# Patient Record
Sex: FEMALE | Race: OTHER
[De-identification: names, ages, dates, MRNs, and addresses within clinical notes are randomized per-mention and may not be internally consistent; named-entity substitution may affect disease eponyms.]

---

## 2021-03-24 PROBLEM — Z00.00 ENCOUNTER FOR PREVENTIVE HEALTH EXAMINATION: Status: ACTIVE | Noted: 2021-03-24

## 2021-04-07 ENCOUNTER — APPOINTMENT (OUTPATIENT)
Dept: SURGERY | Facility: CLINIC | Age: 86
End: 2021-04-07
Payer: MEDICARE

## 2021-04-07 ENCOUNTER — LABORATORY RESULT (OUTPATIENT)
Age: 86
End: 2021-04-07

## 2021-04-07 VITALS
BODY MASS INDEX: 27.88 KG/M2 | TEMPERATURE: 97.3 F | WEIGHT: 142 LBS | HEART RATE: 58 BPM | SYSTOLIC BLOOD PRESSURE: 179 MMHG | DIASTOLIC BLOOD PRESSURE: 67 MMHG | HEIGHT: 60 IN

## 2021-04-07 DIAGNOSIS — Z78.9 OTHER SPECIFIED HEALTH STATUS: ICD-10-CM

## 2021-04-07 DIAGNOSIS — Z87.39 PERSONAL HISTORY OF OTHER DISEASES OF THE MUSCULOSKELETAL SYSTEM AND CONNECTIVE TISSUE: ICD-10-CM

## 2021-04-07 DIAGNOSIS — Z87.19 PERSONAL HISTORY OF OTHER DISEASES OF THE DIGESTIVE SYSTEM: ICD-10-CM

## 2021-04-07 DIAGNOSIS — R73.03 PREDIABETES.: ICD-10-CM

## 2021-04-07 PROCEDURE — 99203 OFFICE O/P NEW LOW 30 MIN: CPT | Mod: 25

## 2021-04-07 PROCEDURE — 11104 PUNCH BX SKIN SINGLE LESION: CPT

## 2021-04-07 NOTE — PROCEDURE
[FreeTextEntry1] : After informed consent was signed by the patient and witnessed by the medical assistant, the skin over the left rectus muscle was prepped with Chlorhexadine and 8cc of 1% lidocaine with epinephrine were used to anesthetize the skin. A 15 blade was then used to make a 1 cm incision. About 0/5-1cm of fatty tissue was removed sharply. Once the specimen was freed, it was sent labeled to pathology in [place]. The wound bed was inspected for hemostasis and controlled with pressure. Once hemostasis was adequate, the wound was closed with 4-0 Vicryl suture. The wound was dressed with steristrips, gauze and tegaderm. The patient tolerated the procedure well.

## 2021-04-07 NOTE — HISTORY OF PRESENT ILLNESS
[de-identified] : 86 yo F referred by Dr. Timmons for abdominal fat pad biopsy. The patient has a history of nephrotic range proteinuria, HTN and CHF. There is some concern that her kidney disease is a result of amyloidosis and she is referred for fat biopsy for pathological diagnosis. The patient is on aspirin 81mg, but no other blood thinners.

## 2021-04-07 NOTE — PHYSICAL EXAM
[Normal Rate and Rhythm] : normal rate and rhythm [Respiratory Effort] : normal respiratory effort [No Rash or Lesion] : No rash or lesion [Alert] : alert [Calm] : calm [de-identified] : NAD, well-appearing [de-identified] : Anicteric [de-identified] : soft, nontender, nondistended

## 2021-04-07 NOTE — CONSULT LETTER
[Dear  ___] : Dear  [unfilled], [( Thank you for referring [unfilled] for consultation for _____ )] : Thank you for referring [unfilled] for consultation for [unfilled] [Please see my note below.] : Please see my note below. [Consult Closing:] : Thank you very much for allowing me to participate in the care of this patient.  If you have any questions, please do not hesitate to contact me. [Sincerely,] : Sincerely, [FreeTextEntry3] : Daniela Merchant MD [DrLuis A  ___] : Dr. CLINE

## 2021-04-07 NOTE — HISTORY OF PRESENT ILLNESS
[de-identified] : 86 yo F referred by Dr. Timmons for abdominal fat pad biopsy. The patient has a history of nephrotic range proteinuria, HTN and CHF. There is some concern that her kidney disease is a result of amyloidosis and she is referred for fat biopsy for pathological diagnosis. The patient is on aspirin 81mg, but no other blood thinners.

## 2021-04-07 NOTE — ASSESSMENT
[FreeTextEntry1] : 86 yo F referred for abdominal fat pad biopsy. \par Risks and benefits discussed.\par Procedure performed today (see procedure note).\par Patient tolerated well.\par Will await pathology (congo red staining).

## 2021-04-07 NOTE — PHYSICAL EXAM
[Normal Rate and Rhythm] : normal rate and rhythm [Respiratory Effort] : normal respiratory effort [No Rash or Lesion] : No rash or lesion [Alert] : alert [Calm] : calm [de-identified] : NAD, well-appearing [de-identified] : Anicteric [de-identified] : soft, nontender, nondistended

## 2021-11-18 ENCOUNTER — APPOINTMENT (OUTPATIENT)
Dept: NEPHROLOGY | Facility: CLINIC | Age: 86
End: 2021-11-18
Payer: MEDICARE

## 2021-11-18 VITALS
DIASTOLIC BLOOD PRESSURE: 78 MMHG | BODY MASS INDEX: 26.06 KG/M2 | RESPIRATION RATE: 16 BRPM | HEIGHT: 61 IN | SYSTOLIC BLOOD PRESSURE: 170 MMHG | WEIGHT: 138 LBS | HEART RATE: 64 BPM

## 2021-11-18 VITALS — SYSTOLIC BLOOD PRESSURE: 170 MMHG | DIASTOLIC BLOOD PRESSURE: 82 MMHG

## 2021-11-18 VITALS — SYSTOLIC BLOOD PRESSURE: 174 MMHG | DIASTOLIC BLOOD PRESSURE: 88 MMHG

## 2021-11-18 DIAGNOSIS — K64.5 PERIANAL VENOUS THROMBOSIS: ICD-10-CM

## 2021-11-18 DIAGNOSIS — Z87.39 PERSONAL HISTORY OF OTHER DISEASES OF THE MUSCULOSKELETAL SYSTEM AND CONNECTIVE TISSUE: ICD-10-CM

## 2021-11-18 DIAGNOSIS — E55.9 VITAMIN D DEFICIENCY, UNSPECIFIED: ICD-10-CM

## 2021-11-18 DIAGNOSIS — N28.9 DISORDER OF KIDNEY AND URETER, UNSPECIFIED: ICD-10-CM

## 2021-11-18 DIAGNOSIS — R31.9 HEMATURIA, UNSPECIFIED: ICD-10-CM

## 2021-11-18 DIAGNOSIS — M25.561 PAIN IN RIGHT KNEE: ICD-10-CM

## 2021-11-18 DIAGNOSIS — M25.562 PAIN IN RIGHT KNEE: ICD-10-CM

## 2021-11-18 DIAGNOSIS — Z86.69 PERSONAL HISTORY OF OTHER DISEASES OF THE NERVOUS SYSTEM AND SENSE ORGANS: ICD-10-CM

## 2021-11-18 DIAGNOSIS — R06.00 DYSPNEA, UNSPECIFIED: ICD-10-CM

## 2021-11-18 DIAGNOSIS — I10 ESSENTIAL (PRIMARY) HYPERTENSION: ICD-10-CM

## 2021-11-18 DIAGNOSIS — Z80.1 FAMILY HISTORY OF MALIGNANT NEOPLASM OF TRACHEA, BRONCHUS AND LUNG: ICD-10-CM

## 2021-11-18 DIAGNOSIS — G56.03 CARPAL TUNNEL SYNDROM,BILATERAL UPPER LIMBS: ICD-10-CM

## 2021-11-18 DIAGNOSIS — K21.9 GASTRO-ESOPHAGEAL REFLUX DISEASE W/OUT ESOPHAGITIS: ICD-10-CM

## 2021-11-18 DIAGNOSIS — R51.9 HEADACHE, UNSPECIFIED: ICD-10-CM

## 2021-11-18 DIAGNOSIS — M70.62 TROCHANTERIC BURSITIS, LEFT HIP: ICD-10-CM

## 2021-11-18 DIAGNOSIS — Z80.3 FAMILY HISTORY OF MALIGNANT NEOPLASM OF BREAST: ICD-10-CM

## 2021-11-18 DIAGNOSIS — M54.6 PAIN IN THORACIC SPINE: ICD-10-CM

## 2021-11-18 PROCEDURE — 99204 OFFICE O/P NEW MOD 45 MIN: CPT

## 2021-11-18 RX ORDER — DOCUSATE SODIUM 100 MG
100 TABLET ORAL
Refills: 0 | Status: DISCONTINUED | COMMUNITY
End: 2021-11-18

## 2021-11-18 RX ORDER — SPIRONOLACTONE 25 MG/1
25 TABLET ORAL
Refills: 0 | Status: ACTIVE | COMMUNITY

## 2021-11-18 RX ORDER — CARVEDILOL 25 MG/1
25 TABLET, FILM COATED ORAL TWICE DAILY
Qty: 180 | Refills: 2 | Status: ACTIVE | COMMUNITY

## 2021-11-18 RX ORDER — FUROSEMIDE 80 MG/1
TABLET ORAL
Refills: 0 | Status: DISCONTINUED | COMMUNITY
End: 2021-11-18

## 2021-11-18 RX ORDER — NIFEDIPINE 90 MG
90 TABLET, EXTENDED RELEASE ORAL DAILY
Refills: 0 | Status: ACTIVE | COMMUNITY

## 2021-11-18 RX ORDER — RAMIPRIL 5 MG/1
5 CAPSULE ORAL DAILY
Qty: 90 | Refills: 0 | Status: ACTIVE | COMMUNITY

## 2021-11-18 RX ORDER — ATORVASTATIN CALCIUM 40 MG/1
40 TABLET, FILM COATED ORAL
Qty: 90 | Refills: 0 | Status: ACTIVE | COMMUNITY

## 2021-11-18 NOTE — CONSULT LETTER
[Dear  ___] : Dear  [unfilled], [Consult Letter:] : I had the pleasure of evaluating your patient, [unfilled]. [Please see my note below.] : Please see my note below. [Consult Closing:] : Thank you very much for allowing me to participate in the care of this patient.  If you have any questions, please do not hesitate to contact me. [Sincerely,] : Sincerely, [FreeTextEntry3] : Sen [DrLuis A  ___] : Dr. CLINE

## 2021-11-18 NOTE — PHYSICAL EXAM
[General Appearance - Alert] : alert [Sclera] : the sclera and conjunctiva were normal [Extraocular Movements] : extraocular movements were intact [Neck Appearance] : the appearance of the neck was normal [] : no respiratory distress [Respiration, Rhythm And Depth] : normal respiratory rhythm and effort [Exaggerated Use Of Accessory Muscles For Inspiration] : no accessory muscle use [Auscultation Breath Sounds / Voice Sounds] : lungs were clear to auscultation bilaterally [Heart Rate And Rhythm] : heart rate was normal and rhythm regular [Heart Sounds Gallop] : no gallops [Heart Sounds] : normal S1 and S2 [Murmurs] : no murmurs [Heart Sounds Pericardial Friction Rub] : no pericardial rub [Full Pulse] : the pedal pulses are present [Bowel Sounds] : normal bowel sounds [Abdomen Soft] : soft [Abdomen Tenderness] : non-tender [No CVA Tenderness] : no ~M costovertebral angle tenderness [No Spinal Tenderness] : no spinal tenderness [Nail Clubbing] : no clubbing  or cyanosis of the fingernails [Involuntary Movements] : no involuntary movements were seen [FreeTextEntry1] : bilateral Heberden's nodes, bilateral Octavia's nodes, mild  ulnar deviation appreciated [Skin Color & Pigmentation] : normal skin color and pigmentation [Skin Turgor] : normal skin turgor [No Focal Deficits] : no focal deficits [Oriented To Time, Place, And Person] : oriented to person, place, and time [Impaired Insight] : insight and judgment were intact [Affect] : the affect was normal [Mood] : the mood was normal

## 2021-11-18 NOTE — REVIEW OF SYSTEMS
[Negative] : Heme/Lymph [FreeTextEntry2] : appetite is pretty good [FreeTextEntry3] : macular degeneration- receives injections [FreeTextEntry7] : lost weight (around 20 pounds) since her admission to the hospital for uncontrolled hypertension.  She changed her diet.

## 2021-11-18 NOTE — ASSESSMENT
[FreeTextEntry1] : Ms. Tidwell is an 86-year old woman with a history of hypertension, currently poorly controlled, and proteinuria, currently nephrotic range,  for an unclear period of time.  The last year witnessed an increase in the blood pressures, progression of the proteinuria (urine protein/creatinine ratio  6.3 g/g (09/03/2021), 11.4 g/g (10/14/2021))  and increase in the BUN/creatinine levels:   58/2.1 (09/03/2021),  60/2.7 (10/19/2021), 56/2.6 (11/17/2021).  \par \par Amyloidosis has been considered in the differential diagnosis of the proteinuria though the fat pad aspirate stained negative for amyloid.  The technetium pyrophosphate cardiac scan was also negative for transthyretin cardiac amyloidosis. The latter test has a reported sensitivity for the ATTR subtype of amyloidosis with a specificity of 100%..   \par \par IMPRESSION/RECOMMENDATIONS:\par \par (1) Hypertension.  Poor control.  Ms. Tidwell has never had a normal blood pressure per her niece.  Every since the hospitalization in the fall of 2020, the blood pressures have remained elevated.  I am unsure if this represents renal artery stenosis or another etiology. \par \par -I asked Micheal to check her mother's blood pressure twice daily.\par -I gave her a pamphlet entitled "Blood Pressure Counts" which teaches how to correctly take blood pressures and lifestyle modification changes to make to control blood pressure. 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  The serum creatinine has progressed since September 2022.  I could not find a reason for this other than the poorly controlled hypertension. Per the history, Ms. Tidwell has not had any medication changes in months, she avoids the use of NSAIDS, she has not taken any antibiotics recently.\par \par -I will have your office forward the last 2 years of chemistries, urine studies, and imaging studies\par \par (3) Nephrotic range proteinuria\par \par -I will have your office forward any prior workup\par \par After review of the information above, I will make recommendations.\par \par

## 2021-11-18 NOTE — HISTORY OF PRESENT ILLNESS
[FreeTextEntry1] : Daniela Tidwell is an 86-year old woman whose past medical history is outlined below.  She has long history of hypertension for many years, bilateral shoulder pain and other musculoskeletal pain syndromes in the past for which she used to use ibuprofen on occasion in the past, proteinuria present for many years, and stage III chronic kidney disease.  She is currently referred for the proteinuria and chronic kidney disease.  Ms. Tidwell is here with her daughter (Mariah) and niece (Uma).\par \par Ms. Tidwell's BUN/creatinine levels have trended as follows:  58/2.1 (09/03/2021),  60/2.7 (10/19/2021), 56/2.6 (11/17/2021).  Her urine protein to creatinine ratio has trended as follows:  6.3 grams per gram (09/03/2021), 11.4 grams per gram 10/14/2021).  Amyloidosis has been considered in the differential diagnosis of the proteinuria though the fat pad aspirate stained negative for amyloid.  The technetium pyrophosphate cardiac scan was also negative for transthyretin cardiac amyloidosis. The latter test has a reported sensitivity for the ATTR subtype of amyloidosis with a specificity of 100%..   \par \par Ms. Tidwell has had proteinuria "for quite a few years".  The details of this are unclear. \par \par Ms. Tidwell has not had any changes in her medication regimen for a number of months.   \par \par Her niece reports that she was admitted to [facility] in the last fall of 2020 for dyspnea and  difficult to control hypertension.  Around this time the medication regimen was changed.  Since that time her blood pressures have been very variable.  \par \par

## 2022-01-11 ENCOUNTER — NON-APPOINTMENT (OUTPATIENT)
Age: 87
End: 2022-01-11

## 2022-01-11 ENCOUNTER — APPOINTMENT (OUTPATIENT)
Dept: NEPHROLOGY | Facility: CLINIC | Age: 87
End: 2022-01-11
Payer: MEDICARE

## 2022-01-11 DIAGNOSIS — N18.4 CHRONIC KIDNEY DISEASE, STAGE 4 (SEVERE): ICD-10-CM

## 2022-01-11 DIAGNOSIS — R80.9 PROTEINURIA, UNSPECIFIED: ICD-10-CM

## 2022-01-11 PROCEDURE — 99214 OFFICE O/P EST MOD 30 MIN: CPT | Mod: 95

## 2022-01-30 DIAGNOSIS — D68.9 COAGULATION DEFECT, UNSPECIFIED: ICD-10-CM

## 2022-01-30 NOTE — ASSESSMENT
[FreeTextEntry1] : Ms. Tidwell is an 86-year old woman with a history of hypertension, with a history of poor control in the past, and proteinuria, currently nephrotic range, for an unclear period of time.  The last year witnessed an increase in the blood pressures, progression of the proteinuria (urine protein/creatinine ratio  6.3 g/g (09/03/2021), 11.4 g/g (10/14/2021))  and increase in the BUN/creatinine levels:   58/2.1 (09/03/2021),  60/2.7 (10/19/2021), 56/2.6 (11/17/2021). \par \par Amyloidosis has been considered in the differential diagnosis of the proteinuria though the fat pad aspirate stained negative for amyloid.  The technetium pyrophosphate cardiac scan was also negative for transthyretin cardiac amyloidosis. The latter test has a reported sensitivity for the ATTR subtype of amyloidosis with a specificity of 100%..   \par \par PROTEINURIA WORKUP:  \par \par Hep B surface Ag          (-)\par Hep B core Ab               (-)\par Hep B surface Ab          41.7 mIU/mL\par Hep C qualitative            (-)\par HOLLI                               (+) 1:80 Titer\par Treponema P antibody   (-)\par Urine immunofixation      (-)\par Serum immunofixation    (-)\par \par Urine free light chain kappa/lambda ratio 3.39 (0.70-6.02 normal)\par \par Serum PEP                     (-)\par Urine P/C ration              4.9 grams per gram\par Urinalysis                       > 300 mg/dL protein, no blood\par \par \par IMPRESSION/RECOMMENDATIONS:\par \par (1) Hypertension.  Poor control.  Ms. Tidwell has never had a normal blood pressure per her niece.  Every since the hospitalization in the fall of 2020, the blood pressures have remained elevated.  I am unsure if this represents renal artery stenosis or another etiology. 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  The serum creatinine has progressed since September 2022.  I could not find a reason for the other than the poorly controlled hypertension. Still, unless it is exceptionally elevated, we would not expect nephrotic-range proteinuria. Per the history, Ms. Tidwell has not had any medication changes in months, she avoids the use of NSAIDS, she has not taken any antibiotics recently.  The differential diagnosis of the proteinuria includes membranous nephropathy (which can present as part of a neoplastic syndrome), FSGS, hyperfiltration in a stage IV chronic kidney, amyloidosis, and minimal change disease (less likely).  \par \par I explained that I have mixed feelings about biopsying this 86-year old woman though a biopsy would likely yield an answer.. We talked about the possibility of a renal biopsy.  I will speak with Dr. Autumn Timmons to gain additional insights into any prior workup and Mariah and Uma will address the idea of a biopsy with Daniela.\par \par I will follow up with her on the phone. \par \par

## 2022-01-30 NOTE — HISTORY OF PRESENT ILLNESS
[Home] : at home, [unfilled] , at the time of the visit. [Medical Office: ([facility])___] : at the medical office located in  [Other:____] : [unfilled] [Verbal consent obtained from patient] : the patient, [unfilled] [FreeTextEntry4] : Summer Maxwell [FreeTextEntry1] : \par \par Daniela Tidwell is an 86-year old woman whose past medical history is outlined below.  She has long history of hypertension, bilateral shoulder pain and other musculoskeletal pain syndromes in the past for which she used to use ibuprofen on occasion, proteinuria present for many years, and stage III chronic kidney disease.  She is currently referred for the proteinuria and chronic kidney disease.  Ms. Tidwell came accompanied by her daughter (Mariah) and niece (Uma) for her consult on 11/18/2021. \par \par In the prior note I commented that Ms. Tidwell's BUN/creatinine levels have trended as follows:  58/2.1 (09/03/2021),  60/2.7 (10/19/2021), 56/2.6 (11/17/2021).  Her urine protein to creatinine ratio has trended as follows:  6.3 grams per gram (09/03/2021), 11.4 grams per gram (10/14/2021).  Amyloidosis has been considered in the differential diagnosis of the proteinuria though the fat pad aspirate stained negative for amyloid.  The technetium pyrophosphate cardiac scan was also negative for transthyretin cardiac amyloidosis. The latter test has a reported sensitivity for the ATTR subtype of amyloidosis with a specificity of 100%..   \par \par Ms. Tidwell has had proteinuria "for quite a few years".  \par \par Her niece reported that Ms. Tidwell was admitted to [facility] in the  fall of 2020 for dyspnea and  difficult to control hypertension.  Around this time the medication regimen was changed.  Since that time her blood pressures have been very variable.  \par \par I am seeing Ms. Tidwell via telehealth video conferencing.  Both Mariah and Uma are present.  Daniela has been doing well.  She has not been ill since our first meeting. \par \par

## 2022-01-30 NOTE — REVIEW OF SYSTEMS
[Negative] : Heme/Lymph [FreeTextEntry2] : appetite is pretty good [FreeTextEntry3] : macular degeneration- receives injections [FreeTextEntry7] : lost weight (around 20 pounds) since her admission to the hospital for uncontrolled hypertension.  She changed her diet.  [FreeTextEntry8] : nocturia x 2-3. This increased from once nightly around following her hospitalization in September 2020 (she started taking a diuretic) [FreeTextEntry9] : Takes Tylenol for shoulder pain

## 2022-01-30 NOTE — CONSULT LETTER
[Dear  ___] : Dear  [unfilled], [Consult Letter:] : I had the pleasure of evaluating your patient, [unfilled]. [Please see my note below.] : Please see my note below. [Consult Closing:] : Thank you very much for allowing me to participate in the care of this patient.  If you have any questions, please do not hesitate to contact me. [Sincerely,] : Sincerely, [FreeTextEntry3] : Sen

## 2022-02-14 ENCOUNTER — RESULT REVIEW (OUTPATIENT)
Age: 87
End: 2022-02-14

## 2025-06-20 ENCOUNTER — RX ONLY (RX ONLY)
Age: OVER 89
End: 2025-06-20

## 2025-06-20 ENCOUNTER — OFFICE (OUTPATIENT)
Dept: URBAN - METROPOLITAN AREA CLINIC 29 | Facility: CLINIC | Age: OVER 89
Setting detail: OPHTHALMOLOGY
End: 2025-06-20
Payer: MEDICARE

## 2025-06-20 DIAGNOSIS — H25.11: ICD-10-CM

## 2025-06-20 DIAGNOSIS — H25.13: ICD-10-CM

## 2025-06-20 PROCEDURE — 92136 OPHTHALMIC BIOMETRY: CPT | Mod: 26,RT | Performed by: OPHTHALMOLOGY

## 2025-06-20 PROCEDURE — 92136 OPHTHALMIC BIOMETRY: CPT | Mod: TC | Performed by: OPHTHALMOLOGY

## 2025-06-20 PROCEDURE — 99213 OFFICE O/P EST LOW 20 MIN: CPT | Performed by: OPHTHALMOLOGY

## 2025-06-20 RX ORDER — PREDNISOLN SP/MOXIFLOX/BROMFEN 1 %-0.5 %
DROPS OPHTHALMIC (EYE)
Qty: 12 | Refills: 1 | Status: ACTIVE | OUTPATIENT

## 2025-06-20 ASSESSMENT — REFRACTION_CURRENTRX
OD_OVR_VA: 20/
OD_ADD: +2.00
OS_ADD: +2.00
OD_CYLINDER: +0.25
OS_SPHERE: +3.00
OD_AXIS: 15
OS_OVR_VA: 20/
OD_VPRISM_DIRECTION: BF
OS_VPRISM_DIRECTION: BF
OS_AXIS: 10
OD_SPHERE: +3.00
OS_CYLINDER: +0.75

## 2025-06-20 ASSESSMENT — VISUAL ACUITY
OS_BCVA: 20/60
OD_BCVA: 20/UNABLE

## 2025-06-20 ASSESSMENT — CORNEAL DYSTROPHY - BAND KERATOPATHY
OD_BANDKERATOPATHY: TEMPORAL 1+ NASAL
OS_BANDKERATOPATHY: 1+ NASAL

## 2025-06-20 ASSESSMENT — REFRACTION_AUTOREFRACTION
OD_SPHERE: +3.25
OD_CYLINDER: +2.50
OS_SPHERE: -19.00
OD_AXIS: 009
OS_CYLINDER: SPH

## 2025-06-25 ENCOUNTER — AMBULATORY SURGERY CENTER (OUTPATIENT)
Dept: URBAN - METROPOLITAN AREA SURGERY 17 | Facility: SURGERY | Age: OVER 89
Setting detail: OPHTHALMOLOGY
End: 2025-06-25
Payer: MEDICARE

## 2025-06-25 DIAGNOSIS — H21.562: ICD-10-CM

## 2025-06-25 DIAGNOSIS — H25.12: ICD-10-CM

## 2025-06-25 PROCEDURE — 66982 XCAPSL CTRC RMVL CPLX WO ECP: CPT | Mod: LT | Performed by: OPHTHALMOLOGY

## 2025-06-26 ENCOUNTER — OFFICE (OUTPATIENT)
Dept: URBAN - METROPOLITAN AREA CLINIC 29 | Facility: CLINIC | Age: OVER 89
Setting detail: OPHTHALMOLOGY
End: 2025-06-26
Payer: MEDICARE

## 2025-06-26 ENCOUNTER — RX ONLY (RX ONLY)
Age: OVER 89
End: 2025-06-26

## 2025-06-26 DIAGNOSIS — H25.11: ICD-10-CM

## 2025-06-26 PROCEDURE — 99024 POSTOP FOLLOW-UP VISIT: CPT | Performed by: OPHTHALMOLOGY

## 2025-06-26 ASSESSMENT — REFRACTION_CURRENTRX
OD_SPHERE: +3.00
OS_AXIS: 10
OS_SPHERE: +3.00
OS_CYLINDER: +0.75
OD_CYLINDER: +0.25
OD_OVR_VA: 20/
OS_OVR_VA: 20/
OD_AXIS: 15
OD_ADD: +2.00
OS_VPRISM_DIRECTION: BF
OS_ADD: +2.00
OD_VPRISM_DIRECTION: BF

## 2025-06-26 ASSESSMENT — REFRACTION_AUTOREFRACTION
OD_CYLINDER: +2.50
OD_SPHERE: +3.25
OS_CYLINDER: SPH
OD_AXIS: 009
OS_SPHERE: -19.00

## 2025-06-26 ASSESSMENT — CONFRONTATIONAL VISUAL FIELD TEST (CVF)
OD_FINDINGS: FULL
OS_FINDINGS: FULL

## 2025-06-26 ASSESSMENT — CORNEAL EDEMA - FOLDS/STRIAE: OS_FOLDSSTRIAE: 2+

## 2025-06-26 ASSESSMENT — CORNEAL DYSTROPHY - BAND KERATOPATHY
OS_BANDKERATOPATHY: 1+ NASAL
OD_BANDKERATOPATHY: TEMPORAL 1+ NASAL

## 2025-06-26 ASSESSMENT — TONOMETRY: OS_IOP_MMHG: 16

## 2025-06-26 ASSESSMENT — VISUAL ACUITY
OS_BCVA: 20/60
OD_BCVA: 20/60

## 2025-08-05 ENCOUNTER — OFFICE (OUTPATIENT)
Dept: URBAN - METROPOLITAN AREA CLINIC 29 | Facility: CLINIC | Age: OVER 89
Setting detail: OPHTHALMOLOGY
End: 2025-08-05
Payer: MEDICARE

## 2025-08-05 ENCOUNTER — RX ONLY (RX ONLY)
Age: OVER 89
End: 2025-08-05

## 2025-08-05 DIAGNOSIS — Z96.1: ICD-10-CM

## 2025-08-05 PROCEDURE — 99024 POSTOP FOLLOW-UP VISIT: CPT | Performed by: OPHTHALMOLOGY

## 2025-08-05 ASSESSMENT — REFRACTION_CURRENTRX
OS_AXIS: 10
OD_OVR_VA: 20/
OS_CYLINDER: +0.75
OS_VPRISM_DIRECTION: BF
OS_ADD: +2.00
OS_OVR_VA: 20/
OD_SPHERE: +3.00
OD_ADD: +2.00
OD_VPRISM_DIRECTION: BF
OS_SPHERE: +3.00
OD_AXIS: 15
OD_CYLINDER: +0.25

## 2025-08-05 ASSESSMENT — REFRACTION_AUTOREFRACTION
OS_CYLINDER: +4.00
OD_AXIS: 180
OD_CYLINDER: +1.25
OS_AXIS: 173
OD_SPHERE: +2.25
OS_SPHERE: -4.75

## 2025-08-05 ASSESSMENT — VISUAL ACUITY
OD_BCVA: 20/50
OS_BCVA: 20/50

## 2025-08-05 ASSESSMENT — TONOMETRY: OS_IOP_MMHG: 12

## 2025-08-05 ASSESSMENT — CONFRONTATIONAL VISUAL FIELD TEST (CVF)
OD_FINDINGS: FULL
OS_FINDINGS: FULL

## 2025-08-05 ASSESSMENT — CORNEAL DYSTROPHY - BAND KERATOPATHY
OD_BANDKERATOPATHY: TEMPORAL 1+ NASAL
OS_BANDKERATOPATHY: 1+ NASAL